# Patient Record
Sex: FEMALE | Race: WHITE | Employment: UNEMPLOYED | URBAN - METROPOLITAN AREA
[De-identification: names, ages, dates, MRNs, and addresses within clinical notes are randomized per-mention and may not be internally consistent; named-entity substitution may affect disease eponyms.]

---

## 2019-03-09 ENCOUNTER — HOSPITAL ENCOUNTER (EMERGENCY)
Age: 34
Discharge: HOME | End: 2019-03-09
Attending: EMERGENCY MEDICINE | Admitting: EMERGENCY MEDICINE
Payer: SELF-PAY

## 2019-03-09 VITALS
SYSTOLIC BLOOD PRESSURE: 107 MMHG | TEMPERATURE: 97.7 F | DIASTOLIC BLOOD PRESSURE: 71 MMHG | OXYGEN SATURATION: 100 % | HEART RATE: 76 BPM | RESPIRATION RATE: 16 BRPM

## 2019-03-09 DIAGNOSIS — K08.89 PAIN, DENTAL: ICD-10-CM

## 2019-03-09 DIAGNOSIS — K02.9 DENTAL CARIES: ICD-10-CM

## 2019-03-09 DIAGNOSIS — R51.9 FACE PAIN: Primary | ICD-10-CM

## 2019-03-09 PROCEDURE — 99283 EMERGENCY DEPT VISIT LOW MDM: CPT

## 2019-03-09 PROCEDURE — 74011250637 HC RX REV CODE- 250/637: Performed by: NURSE PRACTITIONER

## 2019-03-09 RX ORDER — AMOXICILLIN 250 MG/1
500 CAPSULE ORAL
Status: COMPLETED | OUTPATIENT
Start: 2019-03-09 | End: 2019-03-09

## 2019-03-09 RX ORDER — IBUPROFEN 800 MG/1
800 TABLET ORAL
Qty: 20 TAB | Refills: 0 | Status: SHIPPED | OUTPATIENT
Start: 2019-03-09 | End: 2019-03-16

## 2019-03-09 RX ORDER — AMOXICILLIN 500 MG/1
500 TABLET, FILM COATED ORAL 3 TIMES DAILY
Qty: 30 TAB | Refills: 0 | Status: SHIPPED | OUTPATIENT
Start: 2019-03-09

## 2019-03-09 RX ADMIN — AMOXICILLIN 500 MG: 250 CAPSULE ORAL at 13:41

## 2019-03-09 NOTE — ED PROVIDER NOTES
Stephanie Dias is a 29year old female who presents tot he ED with a c/o bilateral lower jaw dental pain for the past 3-4 days. States she doesn't have a dentist, and has not self medicated. History reviewed. No pertinent past medical history. Past Surgical History:   Procedure Laterality Date    HX TUBAL LIGATION  12/22/2010         Family History:   Problem Relation Age of Onset    COPD Mother        Social History     Socioeconomic History    Marital status: SINGLE     Spouse name: Not on file    Number of children: Not on file    Years of education: Not on file    Highest education level: Not on file   Social Needs    Financial resource strain: Not on file    Food insecurity - worry: Not on file    Food insecurity - inability: Not on file   [employer] needs - medical: Not on file   [employer] needs - non-medical: Not on file   Occupational History    Not on file   Tobacco Use    Smoking status: Current Every Day Smoker     Packs/day: 1.00     Years: 10.00     Pack years: 10.00    Smokeless tobacco: Never Used   Substance and Sexual Activity    Alcohol use: Yes     Comment: socially    Drug use: No    Sexual activity: Yes     Partners: Male     Birth control/protection: None   Other Topics Concern    Not on file   Social History Narrative    Not on file         ALLERGIES: Patient has no known allergies. Review of Systems   HENT:        Dental pain     All other systems reviewed and are negative. Vitals:    03/09/19 1200   BP: 107/71   Pulse: 76   Resp: 16   Temp: 97.7 °F (36.5 °C)   SpO2: 100%            Physical Exam   Constitutional: She appears well-developed and well-nourished. No distress. HENT:   Head: Normocephalic and atraumatic. Nose: Nose normal.   Mildly TTP on the left and right mandibular tooth line. Assorted dental caries noted. No abscess noted. Eyes: EOM are normal. Right eye exhibits no discharge. Left eye exhibits no discharge.  No scleral icterus. Neck: Normal range of motion. Neck supple. No tracheal deviation present. Cardiovascular: Normal rate, regular rhythm and intact distal pulses. Pulmonary/Chest: Effort normal and breath sounds normal.   Abdominal: Soft. She exhibits no distension. Genitourinary:   Genitourinary Comments: NE     Musculoskeletal: She exhibits no deformity. Neurological: She is alert. Coordination normal.   Skin: Skin is warm and dry. NE.   Psychiatric: She has a normal mood and affect. Her behavior is normal.   Nursing note and vitals reviewed. MDM  Number of Diagnoses or Management Options  Diagnosis management comments: MDM:  Will refer this dental pain to a dentist.  Adam Medina NP  1:27 PM        Risk of Complications, Morbidity, and/or Mortality  Presenting problems: low  Diagnostic procedures: low  Management options: low           Procedures    Diagnosis:   1. Face pain    2. Dental caries    3. Pain, dental          Disposition:   Discharged to Home      Follow-up Information     Follow up With Specialties Details Why Contact Info    [address] [place] [address]  Call for follow up    [address] [place] [address] Dental General Practice Call for follow up    [facility]  [phone]               Medication List      START taking these medications    amoxicillin 500 mg Tab  Take 500 mg by mouth three (3) times daily. ibuprofen 800 mg tablet  Commonly known as:  MOTRIN  Take 1 Tab by mouth every eight (8) hours as needed for Pain for up to 7 days. ASK your doctor about these medications    oxyCODONE-acetaminophen 5-325 mg per tablet  Commonly known as:  PERCOCET  Take 1 Tab by mouth every four (4) hours as needed for Pain. Max Daily Amount: 6 Tabs.            Where to Get Your Medications      Information about where to get these medications is not yet available    Ask your nurse or doctor about these medications  · amoxicillin 500 mg Tab  · ibuprofen 800 mg tablet

## 2019-03-09 NOTE — DISCHARGE INSTRUCTIONS
[employer] Activation    Thank you for requesting access to [employer]. Please follow the instructions below to securely access and download your online medical record. [employer] allows you to send messages to your doctor, view your test results, renew your prescriptions, schedule appointments, and more. How Do I Sign Up? 1. In your internet browser, go to [email or web address]  2. Click on the First Time User? Click Here link in the Sign In box. You will be redirect to the New Member Sign Up page. 3. Enter your [employer] Access Code exactly as it appears below. You will not need to use this code after youve completed the sign-up process. If you do not sign up before the expiration date, you must request a new code. [employer] Access Code: [email or web address]  Expires: 2019 11:40 AM (This is the date your [employer] access code will )    4. Enter the last four digits of your Social Security Number (xxxx) and Date of Birth (mm/dd/yyyy) as indicated and click Submit. You will be taken to the next sign-up page. 5. Create a [employer] ID. This will be your [employer] login ID and cannot be changed, so think of one that is secure and easy to remember. 6. Create a [employer] password. You can change your password at any time. 7. Enter your Password Reset Question and Answer. This can be used at a later time if you forget your password. 8. Enter your e-mail address. You will receive e-mail notification when new information is available in [address]. 9. Click Sign Up. You can now view and download portions of your medical record. 10. Click the Download Summary menu link to download a portable copy of your medical information. Additional Information    If you have questions, please visit the Frequently Asked Questions section of the [employer] website at [email or web address] Remember, [employer] is NOT to be used for urgent needs. For medical emergencies, dial 911.     \

## 2019-04-22 ENCOUNTER — HOSPITAL ENCOUNTER (EMERGENCY)
Age: 34
Discharge: HOME | End: 2019-04-22
Attending: EMERGENCY MEDICINE
Payer: SELF-PAY

## 2019-04-22 VITALS
SYSTOLIC BLOOD PRESSURE: 112 MMHG | DIASTOLIC BLOOD PRESSURE: 68 MMHG | HEART RATE: 86 BPM | TEMPERATURE: 98.1 F | HEIGHT: 61 IN | OXYGEN SATURATION: 100 % | WEIGHT: 135 LBS | BODY MASS INDEX: 25.49 KG/M2 | RESPIRATION RATE: 16 BRPM

## 2019-04-22 DIAGNOSIS — L02.412 ABSCESS OF LEFT AXILLA: Primary | ICD-10-CM

## 2019-04-22 PROCEDURE — 75810000289 HC I&D ABSCESS SIMP/COMP/MULT

## 2019-04-22 PROCEDURE — 99282 EMERGENCY DEPT VISIT SF MDM: CPT

## 2019-04-23 NOTE — ED PROVIDER NOTES
EMERGENCY DEPARTMENT HISTORY AND PHYSICAL EXAM 
 
Date: 4/22/2019 Patient Name: Sharri Ramirez History of Presenting Illness Chief Complaint Patient presents with  Abscess  
  left axilla History Provided By: Patient Chief Complaint: abscess Duration: 1 Weeks Timing:  Gradual 
Location: left axilla Quality: Aching Severity: Moderate Modifying Factors: none Associated Symptoms: denies any other associated signs or symptoms HPI: Sharri Ramirez is a 29 y.o. female with a PMH of No significant past medical history who presents to the ER complaining of an abscess to her left axilla. Patient states it first started around morning week ago and continues to get larger and more painful. She is tried applying warm wet compresses several times but denied any spontaneous drainage. She denies any recent fevers or chills. She has no other symptoms or complaints. PCP: None Current Outpatient Medications Medication Sig Dispense Refill  amoxicillin 500 mg tab Take 500 mg by mouth three (3) times daily. 30 Tab 0  
 oxyCODONE-acetaminophen (PERCOCET) 5-325 mg per tablet Take 1 Tab by mouth every four (4) hours as needed for Pain. Max Daily Amount: 6 Tabs. 15 Tab 0 Past History Past Medical History: 
History reviewed. No pertinent past medical history. Past Surgical History: 
Past Surgical History:  
Procedure Laterality Date  HX TUBAL LIGATION  12/22/2010 Family History: 
Family History Problem Relation Age of Onset  COPD Mother Social History: 
Social History Tobacco Use  Smoking status: Current Every Day Smoker Packs/day: 1.00 Years: 10.00 Pack years: 10.00  Smokeless tobacco: Never Used Substance Use Topics  Alcohol use: Yes Comment: socially  Drug use: No  
 
 
Allergies: 
No Known Allergies Review of Systems Review of Systems Constitutional: Negative for chills, fatigue and fever. HENT: Negative. Negative for sore throat. Eyes: Negative. Respiratory: Negative for cough and shortness of breath. Cardiovascular: Negative for chest pain and palpitations. Gastrointestinal: Negative for abdominal pain, nausea and vomiting. Genitourinary: Negative for dysuria. Musculoskeletal: Negative. Skin: Positive for rash. Left axilla abscess Neurological: Negative for dizziness, weakness, light-headedness and headaches. Psychiatric/Behavioral: Negative. All other systems reviewed and are negative. Physical Exam  
 
Vitals:  
 04/22/19 2220 BP: 112/68 Pulse: 86 Resp: 16 Temp: 98.1 °F (36.7 °C) SpO2: 100% Weight: 61.2 kg (135 lb) Height: 5' 1\" (1.549 m) Physical Exam  
Constitutional: She is oriented to person, place, and time. She appears well-developed and well-nourished. No distress. HENT:  
Head: Normocephalic and atraumatic. Mouth/Throat: Oropharynx is clear and moist.  
Eyes: Conjunctivae are normal. No scleral icterus. Neck: Neck supple. No JVD present. No tracheal deviation present. Cardiovascular: Normal rate, regular rhythm and normal heart sounds. No murmur heard. Pulmonary/Chest: Effort normal and breath sounds normal. No respiratory distress. She has no wheezes. She has no rales. Abdominal: Soft. There is no tenderness. Musculoskeletal: Normal range of motion. Arms: 
Neurological: She is alert and oriented to person, place, and time. She has normal strength. Gait normal. GCS eye subscore is 4. GCS verbal subscore is 5. GCS motor subscore is 6. Skin: Skin is warm and dry. She is not diaphoretic. Psychiatric: She has a normal mood and affect. Nursing note and vitals reviewed. Diagnostic Study Results Labs - No results found for this or any previous visit (from the past 12 hour(s)). Radiologic Studies - No orders to display CT Results  (Last 48 hours) None CXR Results  (Last 48 hours) None  
  
 
 
 
Medical Decision Making I am the first provider for this patient. I reviewed the vital signs, available nursing notes, past medical history, past surgical history, family history and social history. Vital Signs-Reviewed the patient's vital signs. Records Reviewed: Nursing Notes and Old Medical Records 852 PM 
59-year-old female who presents ER complaint of left axilla abscess onset 1 week ago. Has tried warm wet compresses but denied any spontaneous drainage. No recent fevers or chills. Well-appearing on exam.  We will plan on incision and drainage of the abscess. No indication for further imaging or testing at this time. All questions answered and patient in agreement with plan of care. Will plan for discharge. Odell Garcia PA-C Disposition: 
discharged DISCHARGE NOTE:  
 
  Care plan outlined and precautions discussed. Patient has no new complaints, changes, or physical findings. Results of n/a were reviewed with the patient. All medications were reviewed with the patient; will d/c home with n/a. All of pt's questions and concerns were addressed. Patient was instructed and agrees to follow up with pcp, as well as to return to the ED upon further deterioration. Patient is ready to go home. Follow-up Information Follow up With Specialties Details Why Contact Info [address] EMERGENCY DEPT Emergency Medicine  If symptoms worsen [address] 
[phone] [address] Call in 1 day primary care follow up [place] [address] ([facility]) [address] [phone] Current Discharge Medication List  
  
CONTINUE these medications which have NOT CHANGED Details  
amoxicillin 500 mg tab Take 500 mg by mouth three (3) times daily. Qty: 30 Tab, Refills: 0  
  
oxyCODONE-acetaminophen (PERCOCET) 5-325 mg per tablet Take 1 Tab by mouth every four (4) hours as needed for Pain. Max Daily Amount: 6 Tabs. Qty: 15 Tab, Refills: 0 Provider Notes (Medical Decision Making):  
 
Procedures: 
I&D Abcess Simple Date/Time: 4/22/2019 11:07 PM 
Performed by: Gabriel Langston PA-C Authorized by: Gabriel Langston PA-C Consent:  
  Consent obtained:  Written Consent given by:  Patient Risks discussed:  Bleeding, infection, incomplete drainage and pain Alternatives discussed:  No treatment Location:  
  Type:  Abscess Size:  1.5 cm Location:  Upper extremity Upper extremity location:  Shoulder Shoulder location:  L shoulder (left axilla) Pre-procedure details:  
  Skin preparation:  Betadine Anesthesia (see MAR for exact dosages): Anesthesia method:  Local infiltration Local anesthetic:  Lidocaine 1% w/o epi Procedure type:  
  Complexity:  Simple Procedure details:  
  Needle aspiration: no Incision types:  Stab incision Incision depth:  Subcutaneous Scalpel blade:  11 Wound management:  Probed and deloculated Drainage:  Purulent Drainage amount: Moderate Wound treatment:  Wound left open Packing materials:  None Post-procedure details:  
  Patient tolerance of procedure: Tolerated well, no immediate complications Diagnosis Clinical Impression: 1. Abscess of left axilla

## 2019-04-23 NOTE — DISCHARGE INSTRUCTIONS

## 2019-04-23 NOTE — ED TRIAGE NOTES
Pt ambulatory into triage for c/o abscess to left axilla that she states has been there x1 week and has increased in size and pain. Patient has not attempted drained it herself.